# Patient Record
Sex: MALE | Race: WHITE | HISPANIC OR LATINO | Employment: FULL TIME | URBAN - METROPOLITAN AREA
[De-identification: names, ages, dates, MRNs, and addresses within clinical notes are randomized per-mention and may not be internally consistent; named-entity substitution may affect disease eponyms.]

---

## 2020-12-13 ENCOUNTER — HOSPITAL ENCOUNTER (EMERGENCY)
Facility: MEDICAL CENTER | Age: 40
End: 2020-12-13
Attending: EMERGENCY MEDICINE

## 2020-12-13 ENCOUNTER — APPOINTMENT (OUTPATIENT)
Dept: RADIOLOGY | Facility: MEDICAL CENTER | Age: 40
End: 2020-12-13
Attending: EMERGENCY MEDICINE

## 2020-12-13 VITALS
HEIGHT: 64 IN | WEIGHT: 176.59 LBS | HEART RATE: 69 BPM | BODY MASS INDEX: 30.15 KG/M2 | TEMPERATURE: 97.7 F | DIASTOLIC BLOOD PRESSURE: 91 MMHG | RESPIRATION RATE: 16 BRPM | OXYGEN SATURATION: 95 % | SYSTOLIC BLOOD PRESSURE: 149 MMHG

## 2020-12-13 DIAGNOSIS — K42.9 UMBILICAL HERNIA WITHOUT OBSTRUCTION AND WITHOUT GANGRENE: ICD-10-CM

## 2020-12-13 DIAGNOSIS — R10.12 LEFT UPPER QUADRANT ABDOMINAL PAIN: ICD-10-CM

## 2020-12-13 LAB
ALBUMIN SERPL BCP-MCNC: 4.3 G/DL (ref 3.2–4.9)
ALBUMIN/GLOB SERPL: 1.4 G/DL
ALP SERPL-CCNC: 122 U/L (ref 30–99)
ALT SERPL-CCNC: 63 U/L (ref 2–50)
ANION GAP SERPL CALC-SCNC: 10 MMOL/L (ref 7–16)
AST SERPL-CCNC: 23 U/L (ref 12–45)
BASOPHILS # BLD AUTO: 0.6 % (ref 0–1.8)
BASOPHILS # BLD: 0.05 K/UL (ref 0–0.12)
BILIRUB SERPL-MCNC: 0.5 MG/DL (ref 0.1–1.5)
BUN SERPL-MCNC: 9 MG/DL (ref 8–22)
CALCIUM SERPL-MCNC: 9.4 MG/DL (ref 8.5–10.5)
CHLORIDE SERPL-SCNC: 104 MMOL/L (ref 96–112)
CO2 SERPL-SCNC: 26 MMOL/L (ref 20–33)
CREAT SERPL-MCNC: 0.49 MG/DL (ref 0.5–1.4)
EOSINOPHIL # BLD AUTO: 0.12 K/UL (ref 0–0.51)
EOSINOPHIL NFR BLD: 1.4 % (ref 0–6.9)
ERYTHROCYTE [DISTWIDTH] IN BLOOD BY AUTOMATED COUNT: 43.5 FL (ref 35.9–50)
GLOBULIN SER CALC-MCNC: 3.1 G/DL (ref 1.9–3.5)
GLUCOSE SERPL-MCNC: 117 MG/DL (ref 65–99)
HCT VFR BLD AUTO: 48.1 % (ref 42–52)
HGB BLD-MCNC: 16 G/DL (ref 14–18)
IMM GRANULOCYTES # BLD AUTO: 0.03 K/UL (ref 0–0.11)
IMM GRANULOCYTES NFR BLD AUTO: 0.4 % (ref 0–0.9)
LIPASE SERPL-CCNC: 23 U/L (ref 11–82)
LYMPHOCYTES # BLD AUTO: 1.38 K/UL (ref 1–4.8)
LYMPHOCYTES NFR BLD: 16.2 % (ref 22–41)
MCH RBC QN AUTO: 29.2 PG (ref 27–33)
MCHC RBC AUTO-ENTMCNC: 33.3 G/DL (ref 33.7–35.3)
MCV RBC AUTO: 87.8 FL (ref 81.4–97.8)
MONOCYTES # BLD AUTO: 0.51 K/UL (ref 0–0.85)
MONOCYTES NFR BLD AUTO: 6 % (ref 0–13.4)
NEUTROPHILS # BLD AUTO: 6.42 K/UL (ref 1.82–7.42)
NEUTROPHILS NFR BLD: 75.4 % (ref 44–72)
NRBC # BLD AUTO: 0 K/UL
NRBC BLD-RTO: 0 /100 WBC
PLATELET # BLD AUTO: 286 K/UL (ref 164–446)
PMV BLD AUTO: 10.2 FL (ref 9–12.9)
POTASSIUM SERPL-SCNC: 4.1 MMOL/L (ref 3.6–5.5)
PROT SERPL-MCNC: 7.4 G/DL (ref 6–8.2)
RBC # BLD AUTO: 5.48 M/UL (ref 4.7–6.1)
SODIUM SERPL-SCNC: 140 MMOL/L (ref 135–145)
WBC # BLD AUTO: 8.5 K/UL (ref 4.8–10.8)

## 2020-12-13 PROCEDURE — 99284 EMERGENCY DEPT VISIT MOD MDM: CPT

## 2020-12-13 PROCEDURE — 36415 COLL VENOUS BLD VENIPUNCTURE: CPT

## 2020-12-13 PROCEDURE — 74177 CT ABD & PELVIS W/CONTRAST: CPT

## 2020-12-13 PROCEDURE — 85025 COMPLETE CBC W/AUTO DIFF WBC: CPT

## 2020-12-13 PROCEDURE — 80053 COMPREHEN METABOLIC PANEL: CPT

## 2020-12-13 PROCEDURE — 83690 ASSAY OF LIPASE: CPT

## 2020-12-13 PROCEDURE — 700117 HCHG RX CONTRAST REV CODE 255: Performed by: EMERGENCY MEDICINE

## 2020-12-13 RX ADMIN — IOHEXOL 100 ML: 350 INJECTION, SOLUTION INTRAVENOUS at 11:25

## 2020-12-13 SDOH — HEALTH STABILITY: MENTAL HEALTH: HOW OFTEN DO YOU HAVE A DRINK CONTAINING ALCOHOL?: 2-3 TIMES A WEEK

## 2020-12-13 NOTE — ED NOTES
used pt discharged home to self. understands to follow up in am with pcp. return if symptoms not improving. All questions answered.

## 2020-12-13 NOTE — ED PROVIDER NOTES
ED Provider Note    Scribed for Christy Verma M.D. by Shantell Castro. 12/13/2020, 10:31 AM.    Primary care provider: None reported  Means of arrival: walk-in  History obtained from: patient  History limited by: none    CHIEF COMPLAINT  Chief Complaint   Patient presents with   • Abdominal Pain     left upper abdomen x3days   • Hernia       HPI  Scott Painter is a 40 y.o. male who presents to the Emergency Department for constant left upper quadrant pain onset 3 days ago. He reports associated mild constipation. The pain is not worsened by eating but is by bending over or lying on his side. He denies any history of similar symptoms. Patient denies any vomiting, diarrhea, back pain, dysuria, fever, or COVID symptoms. Patient denies any history of surgical procedures. Patient further reports that he has an umbilical hernia.     REVIEW OF SYSTEMS  Pertinent positives include left upper quadrant pain and constipation. Pertinent negatives include no  vomiting, diarrhea, back pain, dysuria, fever, or COVID symptoms. All other systems reviewed and negative.     PAST MEDICAL HISTORY   None pertinent.     SURGICAL HISTORY  patient denies any surgical history    SOCIAL HISTORY  Social History     Tobacco Use   • Smoking status: Current Every Day Smoker     Packs/day: 0.25   • Smokeless tobacco: Never Used   Substance Use Topics   • Alcohol use: Yes     Frequency: 2-3 times a week   • Drug use: Never      Social History     Substance and Sexual Activity   Drug Use Never       FAMILY HISTORY  History reviewed. No pertinent family history.    CURRENT MEDICATIONS  Home Medications     Reviewed by Socorro Parry R.N. (Registered Nurse) on 12/13/20 at 1020  Med List Status: Complete   Medication Last Dose Status        Patient Al Taking any Medications                       ALLERGIES  Allergies   Allergen Reactions   • Food      carrot       PHYSICAL EXAM  VITAL SIGNS: BP (!) 162/102   Pulse 88   Temp 36.4 °C (97.6  "°F) (Temporal)   Resp 16   Ht 1.626 m (5' 4\")   Wt 80.1 kg (176 lb 9.4 oz)   SpO2 97%   BMI 30.31 kg/m²     Constitutional: Well developed, No acute distress, Non-toxic appearance.   HENT: Normocephalic, Atraumatic, Bilateral external ears normal, Nose normal.   Eyes: PERRL, EOMI, Conjunctiva normal  Neck: Normal range of motion, No tenderness, Supple,  Cardiovascular: Normal heart rate, Normal rhythm  Thorax & Lungs: Normal breath sounds, No respiratory distress,   Abdomen: Left upper quadrant tender to palpation. no rebound or guarding, periumbilical hernia easily reducible with no tenderness, no distention, positive bowel sounds.  Skin: Warm, Dry, No erythema, No rash.   Back: No tenderness, No CVA tenderness.   Extremities: Intact distal pulses, No edema, No tenderness   Neurologic: Alert & oriented x 3, Normal motor function, Normal sensory function, No focal deficits noted.  Psychiatric: Appropriate                                                     DIAGNOSTIC STUDIES / PROCEDURES\    LABS  Results for orders placed or performed during the hospital encounter of 12/13/20   CBC WITH DIFFERENTIAL   Result Value Ref Range    WBC 8.5 4.8 - 10.8 K/uL    RBC 5.48 4.70 - 6.10 M/uL    Hemoglobin 16.0 14.0 - 18.0 g/dL    Hematocrit 48.1 42.0 - 52.0 %    MCV 87.8 81.4 - 97.8 fL    MCH 29.2 27.0 - 33.0 pg    MCHC 33.3 (L) 33.7 - 35.3 g/dL    RDW 43.5 35.9 - 50.0 fL    Platelet Count 286 164 - 446 K/uL    MPV 10.2 9.0 - 12.9 fL    Neutrophils-Polys 75.40 (H) 44.00 - 72.00 %    Lymphocytes 16.20 (L) 22.00 - 41.00 %    Monocytes 6.00 0.00 - 13.40 %    Eosinophils 1.40 0.00 - 6.90 %    Basophils 0.60 0.00 - 1.80 %    Immature Granulocytes 0.40 0.00 - 0.90 %    Nucleated RBC 0.00 /100 WBC    Neutrophils (Absolute) 6.42 1.82 - 7.42 K/uL    Lymphs (Absolute) 1.38 1.00 - 4.80 K/uL    Monos (Absolute) 0.51 0.00 - 0.85 K/uL    Eos (Absolute) 0.12 0.00 - 0.51 K/uL    Baso (Absolute) 0.05 0.00 - 0.12 K/uL    Immature " Granulocytes (abs) 0.03 0.00 - 0.11 K/uL    NRBC (Absolute) 0.00 K/uL   COMP METABOLIC PANEL   Result Value Ref Range    Sodium 140 135 - 145 mmol/L    Potassium 4.1 3.6 - 5.5 mmol/L    Chloride 104 96 - 112 mmol/L    Co2 26 20 - 33 mmol/L    Anion Gap 10.0 7.0 - 16.0    Glucose 117 (H) 65 - 99 mg/dL    Bun 9 8 - 22 mg/dL    Creatinine 0.49 (L) 0.50 - 1.40 mg/dL    Calcium 9.4 8.5 - 10.5 mg/dL    AST(SGOT) 23 12 - 45 U/L    ALT(SGPT) 63 (H) 2 - 50 U/L    Alkaline Phosphatase 122 (H) 30 - 99 U/L    Total Bilirubin 0.5 0.1 - 1.5 mg/dL    Albumin 4.3 3.2 - 4.9 g/dL    Total Protein 7.4 6.0 - 8.2 g/dL    Globulin 3.1 1.9 - 3.5 g/dL    A-G Ratio 1.4 g/dL   LIPASE   Result Value Ref Range    Lipase 23 11 - 82 U/L   ESTIMATED GFR   Result Value Ref Range    GFR If African American >60 >60 mL/min/1.73 m 2    GFR If Non African American >60 >60 mL/min/1.73 m 2      All labs reviewed by me.    RADIOLOGY  CT-ABDOMEN-PELVIS WITH   Final Result      No acute intra-abdominal abnormality is seen.      Hepatic steatosis.           The radiologist's interpretation of all radiological studies have been reviewed by me.    COURSE & MEDICAL DECISION MAKING  Nursing notes, VS, PMSFHx reviewed in chart.     10:31 AM Patient seen and examined at bedside. The patient presents with left upper quadrant pain and the differential diagnosis includes but is not limited to bowel obstruction vs diverticulitis vs constipation vs complications of umbilical hernia. Ordered for CT-abdomen-pelvis, CBC with diff, CMP, and lipase to evaluate.     Laboratory studies have returned and show normal white count without evidence of bandemia.  Patient has minimally elevated ALT of 63.  Glucose was 117.  There is no evidence of a gap acidosis.  Is a normal bilirubin.  There is no evidence of pancreatitis.  No evidence of significant electrolyte abnormalities.  CT scan showed hepatic steatosis but no other etiology for the patient's left upper quadrant pain.  At  this point it is unclear what is causing his pain.  His pain is definitely worse when he moves and therefore this could be musculoskeletal.  It also could be related to a stomach etiology although pain is not worse with food.  Therefore I think gastritis or ulcer is less likely.  I did advise him to try Prilosec.  I advised him to take Tylenol or ibuprofen for possible musculoskeletal etiology.    I counseled the patient that this could be early or atypical course of a significant disease process,  A definitive diagnoses cannot be made at this time. I counseled the patient to be rechecked in 12-24 hours by primary physician or in the ER if this is not possible, unless they are feeling completely better. The patient is to return immediately for increasing pain, localization of pain in the right lower quadrant or right upper quadrant, vomiting not controlled by medications, fever, looking or feeling very ill, or concern. Patient is given aftercare instructions on abdominal pain of unclear etiology.    12:06 PM - Patient reevaluated at bedside and updated on results of the studies. I advised the patient that his symptoms may be a muscle issue. He should try using ibuprofen or Prilosec to manage symptoms. Patient verbalizes understanding and agreement to this plan of care.  Instructions were given via  [id number].     This patient presents with abdominal pain. At this point, the etiology of the patient's symptoms is not entirely clear.  Clinically, the patient is not dehydrated nor does the patient have evidence of a surgical abdomen. Serial abdominal exams are benign--including prior to discharge--no evidence of a surgical abdomen.  There is no suggestion of appendicitis or cholecycstitis. Patient is not obstructed.  Laboratory data is reassuring. There is no pronounced leukocytosis or bandemia. There is no enzymatic evidence of pancreatitis or hepatitis.    Imaging is unremarlable.  The patient will return for  new or worsening symptoms and is stable at the time of discharge.    The patient is referred to a primary physician for blood pressure management, diabetic screening, and for all other preventative health concerns.    DISPOSITION:  Patient will be discharged home in stable condition.    FOLLOW UP:  [facility] [address]  [address], [address]  [place] [id number]  [phone]  Schedule an appointment as soon as possible for a visit   If symptoms worsen, return to the er.      FINAL IMPRESSION  1. Left upper quadrant abdominal pain    2. Umbilical hernia without obstruction and without gangrene          Shantell AMARO (Gt), am scribing for, and in the presence of, Christy Verma M.D..    Electronically signed by: Shantell Castro (Gt), 12/13/2020    Christy AMARO M.D. personally performed the services described in this documentation, as scribed by Shantell Castro in my presence, and it is both accurate and complete. C    The note accurately reflects work and decisions made by me.  Christy Verma M.D.  12/13/2020  1:16 PM

## 2020-12-13 NOTE — ED TRIAGE NOTES
Chief Complaint   Patient presents with   • Abdominal Pain     left upper abdomen x3days   • Hernia     Pt ambulated to triage , Maltese speaking  used #[id number]. Pt c/o left upper abdominal pain x3days, denies n/v/d. He mentioned that has umbilical hernia but denies pain to that area.

## 2020-12-13 NOTE — DISCHARGE INSTRUCTIONS
THE EXACT CAUSE OF YOUR PAIN IS UNCLEAR--THIS MIGHT BE EARLY IN THE DISEASE PROCESS AND WE ARE UNABLE TO IDENTIFY IT AT THIS TIME.  SEE YOUR DOCTOR OR RETURN TO ER IN THE MORNING (OR 8-12 HRS) UNLESS YOU ARE FEELING COMPLETELY BETTER, RETURN SOONER FOR PAIN/NAUSEA NOT CONTROLLED BY MEDS, FEVER, ANY OTHER CONCERN.